# Patient Record
Sex: FEMALE | Race: WHITE | NOT HISPANIC OR LATINO
[De-identification: names, ages, dates, MRNs, and addresses within clinical notes are randomized per-mention and may not be internally consistent; named-entity substitution may affect disease eponyms.]

---

## 2023-10-18 ENCOUNTER — APPOINTMENT (OUTPATIENT)
Dept: PODIATRY | Facility: CLINIC | Age: 63
End: 2023-10-18
Payer: COMMERCIAL

## 2023-10-18 DIAGNOSIS — Z82.49 FAMILY HISTORY OF ISCHEMIC HEART DISEASE AND OTHER DISEASES OF THE CIRCULATORY SYSTEM: ICD-10-CM

## 2023-10-18 DIAGNOSIS — Z83.511 FAMILY HISTORY OF GLAUCOMA: ICD-10-CM

## 2023-10-18 DIAGNOSIS — Z78.9 OTHER SPECIFIED HEALTH STATUS: ICD-10-CM

## 2023-10-18 DIAGNOSIS — M76.829 POSTERIOR TIBIAL TENDINITIS, UNSPECIFIED LEG: ICD-10-CM

## 2023-10-18 PROCEDURE — 73600 X-RAY EXAM OF ANKLE: CPT | Mod: LT

## 2023-10-18 PROCEDURE — 99203 OFFICE O/P NEW LOW 30 MIN: CPT

## 2023-10-18 PROCEDURE — 73630 X-RAY EXAM OF FOOT: CPT | Mod: LT

## 2023-10-30 ENCOUNTER — OUTPATIENT (OUTPATIENT)
Dept: OUTPATIENT SERVICES | Facility: HOSPITAL | Age: 63
LOS: 1 days | End: 2023-10-30
Payer: COMMERCIAL

## 2023-10-30 VITALS
HEART RATE: 67 BPM | HEIGHT: 65.5 IN | SYSTOLIC BLOOD PRESSURE: 126 MMHG | DIASTOLIC BLOOD PRESSURE: 81 MMHG | RESPIRATION RATE: 16 BRPM | OXYGEN SATURATION: 98 % | TEMPERATURE: 98 F | WEIGHT: 166.01 LBS

## 2023-10-30 DIAGNOSIS — N39.0 URINARY TRACT INFECTION, SITE NOT SPECIFIED: ICD-10-CM

## 2023-10-30 DIAGNOSIS — M76.822 POSTERIOR TIBIAL TENDINITIS, LEFT LEG: ICD-10-CM

## 2023-10-30 DIAGNOSIS — Z98.890 OTHER SPECIFIED POSTPROCEDURAL STATES: Chronic | ICD-10-CM

## 2023-10-30 DIAGNOSIS — M19.079 PRIMARY OSTEOARTHRITIS, UNSPECIFIED ANKLE AND FOOT: ICD-10-CM

## 2023-10-30 LAB
ANION GAP SERPL CALC-SCNC: 6 MMOL/L — LOW (ref 7–14)
ANION GAP SERPL CALC-SCNC: 6 MMOL/L — LOW (ref 7–14)
BUN SERPL-MCNC: 15 MG/DL — SIGNIFICANT CHANGE UP (ref 7–23)
BUN SERPL-MCNC: 15 MG/DL — SIGNIFICANT CHANGE UP (ref 7–23)
CALCIUM SERPL-MCNC: 9.7 MG/DL — SIGNIFICANT CHANGE UP (ref 8.4–10.5)
CALCIUM SERPL-MCNC: 9.7 MG/DL — SIGNIFICANT CHANGE UP (ref 8.4–10.5)
CHLORIDE SERPL-SCNC: 105 MMOL/L — SIGNIFICANT CHANGE UP (ref 98–107)
CHLORIDE SERPL-SCNC: 105 MMOL/L — SIGNIFICANT CHANGE UP (ref 98–107)
CO2 SERPL-SCNC: 30 MMOL/L — SIGNIFICANT CHANGE UP (ref 22–31)
CO2 SERPL-SCNC: 30 MMOL/L — SIGNIFICANT CHANGE UP (ref 22–31)
CREAT SERPL-MCNC: 0.74 MG/DL — SIGNIFICANT CHANGE UP (ref 0.5–1.3)
CREAT SERPL-MCNC: 0.74 MG/DL — SIGNIFICANT CHANGE UP (ref 0.5–1.3)
EGFR: 90 ML/MIN/1.73M2 — SIGNIFICANT CHANGE UP
EGFR: 90 ML/MIN/1.73M2 — SIGNIFICANT CHANGE UP
GLUCOSE SERPL-MCNC: 79 MG/DL — SIGNIFICANT CHANGE UP (ref 70–99)
GLUCOSE SERPL-MCNC: 79 MG/DL — SIGNIFICANT CHANGE UP (ref 70–99)
HCT VFR BLD CALC: 41.6 % — SIGNIFICANT CHANGE UP (ref 34.5–45)
HCT VFR BLD CALC: 41.6 % — SIGNIFICANT CHANGE UP (ref 34.5–45)
HGB BLD-MCNC: 13.4 G/DL — SIGNIFICANT CHANGE UP (ref 11.5–15.5)
HGB BLD-MCNC: 13.4 G/DL — SIGNIFICANT CHANGE UP (ref 11.5–15.5)
MCHC RBC-ENTMCNC: 31.3 PG — SIGNIFICANT CHANGE UP (ref 27–34)
MCHC RBC-ENTMCNC: 31.3 PG — SIGNIFICANT CHANGE UP (ref 27–34)
MCHC RBC-ENTMCNC: 32.2 GM/DL — SIGNIFICANT CHANGE UP (ref 32–36)
MCHC RBC-ENTMCNC: 32.2 GM/DL — SIGNIFICANT CHANGE UP (ref 32–36)
MCV RBC AUTO: 97.2 FL — SIGNIFICANT CHANGE UP (ref 80–100)
MCV RBC AUTO: 97.2 FL — SIGNIFICANT CHANGE UP (ref 80–100)
NRBC # BLD: 0 /100 WBCS — SIGNIFICANT CHANGE UP (ref 0–0)
NRBC # BLD: 0 /100 WBCS — SIGNIFICANT CHANGE UP (ref 0–0)
NRBC # FLD: 0 K/UL — SIGNIFICANT CHANGE UP (ref 0–0)
NRBC # FLD: 0 K/UL — SIGNIFICANT CHANGE UP (ref 0–0)
PLATELET # BLD AUTO: 191 K/UL — SIGNIFICANT CHANGE UP (ref 150–400)
PLATELET # BLD AUTO: 191 K/UL — SIGNIFICANT CHANGE UP (ref 150–400)
POTASSIUM SERPL-MCNC: 5 MMOL/L — SIGNIFICANT CHANGE UP (ref 3.5–5.3)
POTASSIUM SERPL-MCNC: 5 MMOL/L — SIGNIFICANT CHANGE UP (ref 3.5–5.3)
POTASSIUM SERPL-SCNC: 5 MMOL/L — SIGNIFICANT CHANGE UP (ref 3.5–5.3)
POTASSIUM SERPL-SCNC: 5 MMOL/L — SIGNIFICANT CHANGE UP (ref 3.5–5.3)
RBC # BLD: 4.28 M/UL — SIGNIFICANT CHANGE UP (ref 3.8–5.2)
RBC # BLD: 4.28 M/UL — SIGNIFICANT CHANGE UP (ref 3.8–5.2)
RBC # FLD: 12.5 % — SIGNIFICANT CHANGE UP (ref 10.3–14.5)
RBC # FLD: 12.5 % — SIGNIFICANT CHANGE UP (ref 10.3–14.5)
SODIUM SERPL-SCNC: 141 MMOL/L — SIGNIFICANT CHANGE UP (ref 135–145)
SODIUM SERPL-SCNC: 141 MMOL/L — SIGNIFICANT CHANGE UP (ref 135–145)
WBC # BLD: 5.17 K/UL — SIGNIFICANT CHANGE UP (ref 3.8–10.5)
WBC # BLD: 5.17 K/UL — SIGNIFICANT CHANGE UP (ref 3.8–10.5)
WBC # FLD AUTO: 5.17 K/UL — SIGNIFICANT CHANGE UP (ref 3.8–10.5)
WBC # FLD AUTO: 5.17 K/UL — SIGNIFICANT CHANGE UP (ref 3.8–10.5)

## 2023-10-30 PROCEDURE — 93010 ELECTROCARDIOGRAM REPORT: CPT

## 2023-10-30 NOTE — H&P PST ADULT - PROBLEM SELECTOR PLAN 1
Scheduled subtalar joint fusion tailor navicular fusion   Written & verbal preop instructions, gi prophylaxis & surgical soap given  Pt verbalized good understanding.  Teach back done on surgical soap instructions. Scheduled subtalar joint fusion tailor navicular fusion.  Written & verbal preop instructions, gi prophylaxis & surgical soap given  Pt verbalized good understanding.  Teach back done on surgical soap instructions.

## 2023-10-30 NOTE — H&P PST ADULT - NSICDXPASTMEDICALHX_GEN_ALL_CORE_FT
PAST MEDICAL HISTORY:  Primary osteoarthritis, unspecified ankle and foot     Urinary tract infection

## 2023-10-30 NOTE — H&P PST ADULT - NSANTHOSAYNRD_GEN_A_CORE
No. ZEFERINO screening performed.  STOP BANG Legend: 0-2 = LOW Risk; 3-4 = INTERMEDIATE Risk; 5-8 = HIGH Risk

## 2023-10-30 NOTE — H&P PST ADULT - NSICDXFAMILYHX_GEN_ALL_CORE_FT
FAMILY HISTORY:  Father  Still living? No  Family history of heart attack, Age at diagnosis: Age Unknown    Mother  Still living? No  FH: brain cancer, Age at diagnosis: Age Unknown

## 2023-10-30 NOTE — H&P PST ADULT - HISTORY OF PRESENT ILLNESS
62y/o female presents for preop eval for   Pt st 64y/o female presents for preop eval for scheduled subtalar joint fusion tailor navicular fusion.   Pt with c/o left foot pain for past several years.  States worse with prolong standing or walking.   Recent imaging show "no arch and collapse tendon".  Preop dx Primary osteoarthritis unspecified ankle and foot.

## 2023-11-01 ENCOUNTER — APPOINTMENT (OUTPATIENT)
Dept: PODIATRY | Facility: CLINIC | Age: 63
End: 2023-11-01
Payer: COMMERCIAL

## 2023-11-01 DIAGNOSIS — M21.40 FLAT FOOT [PES PLANUS] (ACQUIRED), UNSPECIFIED FOOT: ICD-10-CM

## 2023-11-01 DIAGNOSIS — M19.079 PRIMARY OSTEOARTHRITIS, UNSPECIFIED ANKLE AND FOOT: ICD-10-CM

## 2023-11-01 DIAGNOSIS — Z78.9 OTHER SPECIFIED HEALTH STATUS: ICD-10-CM

## 2023-11-01 DIAGNOSIS — M25.572 PAIN IN LEFT ANKLE AND JOINTS OF LEFT FOOT: ICD-10-CM

## 2023-11-01 PROCEDURE — 99213 OFFICE O/P EST LOW 20 MIN: CPT

## 2023-11-02 ENCOUNTER — TRANSCRIPTION ENCOUNTER (OUTPATIENT)
Age: 63
End: 2023-11-02

## 2023-11-02 PROBLEM — M21.40 PES PLANUS, UNSPECIFIED LATERALITY: Status: ACTIVE | Noted: 2023-10-18

## 2023-11-03 PROBLEM — N39.0 URINARY TRACT INFECTION, SITE NOT SPECIFIED: Chronic | Status: ACTIVE | Noted: 2023-10-30

## 2023-11-03 PROBLEM — M19.079 PRIMARY OSTEOARTHRITIS, UNSPECIFIED ANKLE AND FOOT: Chronic | Status: ACTIVE | Noted: 2023-10-30

## 2023-11-05 ENCOUNTER — TRANSCRIPTION ENCOUNTER (OUTPATIENT)
Age: 63
End: 2023-11-05

## 2023-11-06 ENCOUNTER — APPOINTMENT (OUTPATIENT)
Dept: PODIATRY | Facility: HOSPITAL | Age: 63
End: 2023-11-06

## 2023-11-06 ENCOUNTER — RESULT REVIEW (OUTPATIENT)
Age: 63
End: 2023-11-06

## 2023-11-06 ENCOUNTER — TRANSCRIPTION ENCOUNTER (OUTPATIENT)
Age: 63
End: 2023-11-06

## 2023-11-06 ENCOUNTER — OUTPATIENT (OUTPATIENT)
Dept: OUTPATIENT SERVICES | Facility: HOSPITAL | Age: 63
LOS: 1 days | Discharge: HOME | End: 2023-11-06
Payer: COMMERCIAL

## 2023-11-06 VITALS
DIASTOLIC BLOOD PRESSURE: 62 MMHG | HEART RATE: 78 BPM | OXYGEN SATURATION: 97 % | TEMPERATURE: 98 F | SYSTOLIC BLOOD PRESSURE: 112 MMHG | RESPIRATION RATE: 18 BRPM

## 2023-11-06 VITALS
SYSTOLIC BLOOD PRESSURE: 119 MMHG | HEART RATE: 72 BPM | HEIGHT: 65.5 IN | WEIGHT: 166.01 LBS | TEMPERATURE: 98 F | RESPIRATION RATE: 16 BRPM | OXYGEN SATURATION: 96 % | DIASTOLIC BLOOD PRESSURE: 66 MMHG

## 2023-11-06 DIAGNOSIS — Z98.890 OTHER SPECIFIED POSTPROCEDURAL STATES: Chronic | ICD-10-CM

## 2023-11-06 DIAGNOSIS — M76.822 POSTERIOR TIBIAL TENDINITIS, LEFT LEG: ICD-10-CM

## 2023-11-06 PROCEDURE — 28725 ARTHRODESIS SUBTALAR: CPT

## 2023-11-06 PROCEDURE — 28740 FUSION OF FOOT BONES: CPT

## 2023-11-06 PROCEDURE — 73630 X-RAY EXAM OF FOOT: CPT | Mod: 26,LT

## 2023-11-06 DEVICE — IMPLANTABLE DEVICE: Type: IMPLANTABLE DEVICE | Status: FUNCTIONAL

## 2023-11-06 DEVICE — GWIRE 2X150MM: Type: IMPLANTABLE DEVICE | Status: FUNCTIONAL

## 2023-11-06 DEVICE — PIN STEINMAN SMTH 2.5X100MM: Type: IMPLANTABLE DEVICE | Status: FUNCTIONAL

## 2023-11-06 DEVICE — GWIRE CALIB 3.2X300MM SS: Type: IMPLANTABLE DEVICE | Status: FUNCTIONAL

## 2023-11-06 RX ORDER — NITROFURANTOIN MACROCRYSTAL 50 MG
1 CAPSULE ORAL
Refills: 0 | DISCHARGE

## 2023-11-06 NOTE — ASU DISCHARGE PLAN (ADULT/PEDIATRIC) - CARE PROVIDER_API CALL
Lombardi, Charles M  [facility]  [address] [place] [address]  Phone: [phone]  Fax: [phone]  Established Patient  Follow Up Time: 1 week

## 2023-11-06 NOTE — ASU DISCHARGE PLAN (ADULT/PEDIATRIC) - FOLLOW UP APPOINTMENTS
----- Message from Angelia Sampson sent at 8/7/2020  2:21 PM CDT -----  Contact: Pt radha [email or web address]@[phone]--  Needs Advice    Reason for call:--WIC form--        Communication Preference:--Yannick--Mom--[phone]--    Additional Information:Mom calling to se if the [employer] form can be fax to over today? Please fax to [phone]. Please call to advise.            
Returned call, spoke to grandmother and informed her that we are working on WIC form.   
[phone]

## 2023-11-06 NOTE — ASU DISCHARGE PLAN (ADULT/PEDIATRIC) - ASU DC SPECIAL INSTRUCTIONSFT
You just had L foot surgery   - Keep your dressings clean dry and intact  - Do not remove the cast   - Do not weight bear to the L foot   - Take pain medications as instructed   - Follow up with Dr. Lombardi within 1 week You just had L foot surgery   - Keep your dressings clean dry and intact  - Do not remove the cast   - Do not weight bear to the L foot   - Take pain medications as instructed   - Follow up with Dr. Lombardi/ Petra within 1 week

## 2023-11-06 NOTE — ASU DISCHARGE PLAN (ADULT/PEDIATRIC) - NS MD DC FALL RISK RISK
For information on Fall & Injury Prevention, visit: [email or web address] OR  [email or web address] OR  https://www.cdc.gov/steadi/patient.html

## 2023-11-06 NOTE — ASU DISCHARGE PLAN (ADULT/PEDIATRIC) - NURSING INSTRUCTIONS
Progress to regular diet as tolerated.  Keep well hydrated. Next dose of Tylenol may be taken at 6pm

## 2023-11-10 ENCOUNTER — APPOINTMENT (OUTPATIENT)
Dept: PODIATRY | Facility: CLINIC | Age: 63
End: 2023-11-10
Payer: COMMERCIAL

## 2023-11-10 PROCEDURE — 99024 POSTOP FOLLOW-UP VISIT: CPT

## 2023-11-20 ENCOUNTER — APPOINTMENT (OUTPATIENT)
Dept: PODIATRY | Facility: CLINIC | Age: 63
End: 2023-11-20
Payer: COMMERCIAL

## 2023-11-20 DIAGNOSIS — M21.40 FLAT FOOT [PES PLANUS] (ACQUIRED), UNSPECIFIED FOOT: ICD-10-CM

## 2023-11-20 PROCEDURE — 73630 X-RAY EXAM OF FOOT: CPT | Mod: LT

## 2023-11-20 RX ORDER — OXYCODONE AND ACETAMINOPHEN 5; 325 MG/1; MG/1
5-325 TABLET ORAL
Qty: 42 | Refills: 0 | Status: COMPLETED | COMMUNITY
Start: 2023-11-06 | End: 2023-11-20

## 2023-12-04 ENCOUNTER — APPOINTMENT (OUTPATIENT)
Dept: PODIATRY | Facility: CLINIC | Age: 63
End: 2023-12-04
Payer: COMMERCIAL

## 2023-12-04 PROCEDURE — 73630 X-RAY EXAM OF FOOT: CPT | Mod: LT

## 2023-12-18 ENCOUNTER — APPOINTMENT (OUTPATIENT)
Dept: PODIATRY | Facility: CLINIC | Age: 63
End: 2023-12-18
Payer: COMMERCIAL

## 2023-12-18 PROCEDURE — 73630 X-RAY EXAM OF FOOT: CPT | Mod: LT

## 2023-12-21 NOTE — HISTORY OF PRESENT ILLNESS
[FreeTextEntry1] : Patient presents today for postoperative follow up of left foot subtalar and talonavicular joint arthrodesis performed on 11/06/23.  Patient states that on 12/16/23, she lost her balance as she was getting up from a chair and going to her scooter, and she hit her heel on the ground.  She did not lose consciousness, deny any head strike.  She felt immediate pain to the plantar heel.  The pain has been slowly improving since the episode of trauma.  She has been elevating but she has not iced it.  Patient has been non-weight bearing with crutches and the knee scooter.

## 2023-12-21 NOTE — ASSESSMENT
[FreeTextEntry1] : Impression: Postoperative visit after left subtalar and talonavicular joint fusion (Z48.89).  Treatment: Advised patient to continue to be non-weight bearing in the Cam boot.  Continue elevation, compression with CompressiGrip.  Patient can use ice over the heel.  Will continue non-weight bearing for 2 more weeks with repeat imaging and then likely will transition to weight bearing with the Cam boot.   Return: 2 weeks for repeat imaging.

## 2023-12-21 NOTE — PROCEDURE
[FreeTextEntry1] : X-rays were taken of the left foot to evaluate the heel. (4 views, non-weight bearing) Patient did not put enough pressure on the left heel for a proper calcaneal axial view.  The hardware is intact across the subtalar joint with one screw and over the talonavicular joint with 2 screws and staple.  No migration of hardware visible.  No breakage.  Increasing osseous consolidation visible across the subtalar joint.  No change in osseous consolidation visible across the talonavicular joint compared to prior imaging.  No calcaneal fracture or lucency identified.   The foot is rectus.  No acute fractures/dislocations or erosions present.

## 2023-12-21 NOTE — PHYSICAL EXAM
[2+] : left foot dorsalis pedis 2+ [Antalgic Gait Left] : antalgic on the left [Reverse Phalen's Test Both Wrists] : negative left [Dean's Test For Left Radial Artery Patency] : positive left [Sensation] : the sensory exam was normal to light touch and pinprick [No Focal Deficits] : no focal deficits [Deep Tendon Reflexes (DTR)] : deep tendon reflexes were 2+ and symmetric [Motor Exam] : the motor exam was normal [Ankle Swelling (On Exam)] : not present [FreeTextEntry3] : CFT: 3 seconds x 10.  Temperature gradient: normal.  [de-identified] : Increased pain on palpation at the plantar heel in the area of the head of the screw.  No prominent hardware palpable.  No gross deformities.  No erythema present.  No pain on palpation over the talar-navicular joint fusion site.  No pain on palpation over the subtalar and midtarsal joint ROM on the left.  Ankle joint ROM intact, non-painful, non-crepitant with passive/active ROM at the left ankle present.   [FreeTextEntry1] : Left foot incision site well coapted without dehiscence, no hematoma, fluctuance, erythema, increase in temperature, soft tissue crepitus, drainage, purulence or malodor appreciated.  Edema is resolving.  Resolved pain on palpation over the incision sites present.  Ecchymosis at the bases of 2 through 4 resolving.  No pain with calf squeeze or with ankle joint dorsiflexion on the left.

## 2024-01-02 ENCOUNTER — APPOINTMENT (OUTPATIENT)
Dept: PODIATRY | Facility: CLINIC | Age: 64
End: 2024-01-02
Payer: COMMERCIAL

## 2024-01-02 PROCEDURE — 73630 X-RAY EXAM OF FOOT: CPT | Mod: LT

## 2024-01-16 ENCOUNTER — APPOINTMENT (OUTPATIENT)
Dept: PODIATRY | Facility: CLINIC | Age: 64
End: 2024-01-16

## 2024-01-23 ENCOUNTER — APPOINTMENT (OUTPATIENT)
Dept: PODIATRY | Facility: CLINIC | Age: 64
End: 2024-01-23
Payer: COMMERCIAL

## 2024-01-23 PROCEDURE — 73630 X-RAY EXAM OF FOOT: CPT | Mod: LT

## 2024-01-23 NOTE — ASSESSMENT
[FreeTextEntry1] : Impression: Postoperative visit after left subtalar and talonavicular joint fusion (Z48.89).  Treatment: Advised patient to start transitioning to weight bearing with the Cam boot and crutches.  She can transition out of the crutches entirely when she feels comfortable.  Continue ROM exercises, when not weight bearing.   Return: 2 weeks for repeat imaging.

## 2024-01-23 NOTE — HISTORY OF PRESENT ILLNESS
[Post-op Sandals] : post-op sandals [Crutches] : crutches [CAM Boot] : a CAM boot [FreeTextEntry1] : Patient presents today for postoperative follow up of left foot subtalar and talonavicular joint arthrodesis performed on 11/06/23.  Patient states that her left heel pain has improved.  She still feels occasional pain to the area.  Denies any further trauma, falls.  Denies any redness or drainage.  Denies any new pain.  She has been doing off-weight bearing ROM exercises.  She has been non-weight bearing in the Cam boot on the left side with crutches and knee scooter.

## 2024-01-23 NOTE — PROCEDURE
[FreeTextEntry1] : X-rays were taken of the left foot to evaluate for osseous consolidation of hardware.   (4 views - weight bearing) Increased osseous consolidation visible across the talonavicular and subtalar joint fusion sites with hardware intact.  No subsidence, breakage or migration.  No lucencies.  No fractures/dislocations or erosions.  The foot is rectus.  No flatfoot deformity present.

## 2024-01-23 NOTE — PHYSICAL EXAM
[2+] : left foot dorsalis pedis 2+ [Antalgic Gait Left] : antalgic on the left [Dean's Test For Left Radial Artery Patency] : positive left [Sensation] : the sensory exam was normal to light touch and pinprick [No Focal Deficits] : no focal deficits [Deep Tendon Reflexes (DTR)] : deep tendon reflexes were 2+ and symmetric [Motor Exam] : the motor exam was normal [Reverse Phalen's Test Both Wrists] : negative left [Ankle Swelling (On Exam)] : not present [FreeTextEntry3] : CFT: 3 seconds x 10.  Temperature gradient: normal.  [de-identified] : No pain on palpation over the left plantar heel insertion of the calcaneal screw.  No prominent hardware palpable.  No gross deformities.  No erythema, minimal edema.  No pain on palpation over the talar-navicular joint fusion site or the subtalar joint fusion site.  Absent subtalar and talar navicular joint motion.  Normal ankle joint ROM, non-painful, non-crepitant.  The foot is rectus to the leg.     [FreeTextEntry1] : Left foot incision site well coapted without dehiscence, no hematoma, fluctuance, erythema, increase in temperature, soft tissue crepitus, drainage, purulence or malodor appreciated.  Edema is resolving.  Resolved pain on palpation over the incision sites present.  Ecchymosis at the bases of 2 through 4 resolving.  No pain with calf squeeze or with ankle joint dorsiflexion on the left.

## 2024-02-20 ENCOUNTER — APPOINTMENT (OUTPATIENT)
Dept: PODIATRY | Facility: CLINIC | Age: 64
End: 2024-02-20
Payer: COMMERCIAL

## 2024-02-20 DIAGNOSIS — Z48.89 ENCOUNTER FOR OTHER SPECIFIED SURGICAL AFTERCARE: ICD-10-CM

## 2024-02-20 PROCEDURE — 73630 X-RAY EXAM OF FOOT: CPT | Mod: LT

## 2024-02-21 PROBLEM — Z48.89 POSTOPERATIVE VISIT: Status: ACTIVE | Noted: 2023-11-14

## 2024-02-23 NOTE — ASSESSMENT
[FreeTextEntry1] : Impression: Postoperative visit after left subtalar and talonavicular joint fusion.  Treatment: I advised patient to transition from crutches to a cane. She was provided with a script that she could obtain a cane from a surgical supply store. I advised patient to continue wearing supportive sneakers. She was also referred to physical therapy for strengthening exercises. Will see patient back in one month. Patient was seen with Dr. Lombardi.

## 2024-02-23 NOTE — PROCEDURE
[FreeTextEntry1] : X-ray of the left foot, 4 views weight-bearing, were taken to evaluate for fracture vs. osseous consolidation. Consolidated subtalar joint and talonavicular joint fusions with hardware intact. No subsidence or hardware breakage or migration. No lucencies at the fusion sites at the joints. No fractures or dislocations. Foot is rectus. No residual flatfoot deformity present.

## 2024-02-23 NOTE — HISTORY OF PRESENT ILLNESS
[FreeTextEntry1] : Patient returns today for postoperative follow-up after left foot subtalar and talonavicular joint arthrodesis performed on 11/6/23. Patient has transitioned into sneakers. She ambulates with crutches for balance. She states she puts the majority of the pressure on her left foot when she walks.  She reports occasional pain to the medial aspect of her left ankle when she walks. She wears Compressogrip for swelling control.

## 2024-02-23 NOTE — PHYSICAL EXAM
[2+] : left foot dorsalis pedis 2+ [Sensation] : the sensory exam was normal to light touch and pinprick [No Focal Deficits] : no focal deficits [Deep Tendon Reflexes (DTR)] : deep tendon reflexes were 2+ and symmetric [Motor Exam] : the motor exam was normal [Ankle Swelling (On Exam)] : not present [FreeTextEntry3] : CFT: 3 seconds x 10. Temperature gradient: normal. [de-identified] : Left medial ankle/foot mild discomfort upon palpation over the tarsal tunnel with no associated swelling, neg Tinnel signs. Muscle power of dorsilfexion, plantar flexion, eversion and inversion of the left lower extremity is 4/5. No edema present over the tarsal tunnel. No pain on palpation over the hardware of the left foot with no hardware prominence under the skin. Absent subtalar and midtarsal joint ROM on the left. Normal ankle joint ROM, non-painful, non-crepitant with no restrictions. No pain on palpation over the medial and lateral malleoli.  Foot is rectus to the leg upon weight-bearing.  [FreeTextEntry1] : All incisions sites are well coapted without dehiscence. No clinical signs of infection. Mild edema is present in the left foot. No pain on palpation over the incision sites.

## 2024-03-21 ENCOUNTER — APPOINTMENT (OUTPATIENT)
Dept: PODIATRY | Facility: CLINIC | Age: 64
End: 2024-03-21
Payer: COMMERCIAL

## 2024-03-21 DIAGNOSIS — M19.072 PRIMARY OSTEOARTHRITIS, LEFT ANKLE AND FOOT: ICD-10-CM

## 2024-03-21 PROCEDURE — 73630 X-RAY EXAM OF FOOT: CPT | Mod: LT

## 2024-03-25 PROBLEM — M19.072 PRIMARY OSTEOARTHRITIS OF LEFT FOOT: Status: ACTIVE | Noted: 2024-03-22

## 2024-03-25 NOTE — HISTORY OF PRESENT ILLNESS
[FreeTextEntry1] : Patient presents today with her  for evaluation. She is just over 4 months S/P double arthrodesis of the rearfoot. Adilia is away, so I am seeing the patient. She is doing well. She never had a chance to do therapy because she had gotten sick, but she has been using stability sneakers and walking without problem. She is really progressing nicely without complaint.

## 2024-03-25 NOTE — PROCEDURE
[FreeTextEntry1] : X-rays were taken to evaluate healing. X-ray Report: (Left foot - 3 views) Multiple views demonstrate excellent repositioning of the foot with good signs of osteogenesis, healing and fusion at both fusion sites with fixation in good position without signs of breakage.

## 2024-03-25 NOTE — PHYSICAL EXAM
[2+] : right foot dorsalis pedis 2+ [Sensation] : the sensory exam was normal to light touch and pinprick [No Focal Deficits] : no focal deficits [Deep Tendon Reflexes (DTR)] : deep tendon reflexes were 2+ and symmetric [Ankle Swelling (On Exam)] : not present [FreeTextEntry3] : CFT: 3 seconds x 10. Temperature gradient: normal. [de-identified] : There is minimal swelling. There is good positioning of the foot and really good clinical signs of healing at the TN joint and subtalar joint with no pain with palpation over the incisions or surgical site. [FreeTextEntry1] : Incisions are well coapted.

## 2024-03-25 NOTE — ASSESSMENT
[FreeTextEntry1] : Impression: Osteoarthritis.  Treatment: At this point she has been walking comfortably in shoes. I recommend she continues to walk only and take it easy for another 2 months in her stability sneakers. I explained to the patient that it seems like she is doing really nicely and walking well without pain, so no need for therapy. She will just continue to only walk with her assistive device for the next 2 months and then will gradually increase activity thereafter. She is discharged from treatment. She can follow-up in the office with any issues whatsoever.

## 2024-07-23 NOTE — ASU PREOPERATIVE ASSESSMENT, ADULT ([facility] ONLY) - TEMPERATURE IN FAHRENHEIT (DEGREES F)
97.5 Detail Level: Detailed Depth Of Biopsy: dermis Was A Bandage Applied: Yes Size Of Lesion In Cm: 0.5 X Size Of Lesion In Cm: 0 Biopsy Type: H and E Biopsy Method: Dermablade Anesthesia Type: 1% lidocaine with epinephrine Hemostasis: Drysol Wound Care: Petrolatum Dressing: bandage Destruction After The Procedure: No Type Of Destruction Used: Curettage Curettage Text: The wound bed was treated with curettage after the biopsy was performed. Cryotherapy Text: The wound bed was treated with cryotherapy after the biopsy was performed. Electrodesiccation Text: The wound bed was treated with electrodesiccation after the biopsy was performed. Electrodesiccation And Curettage Text: The wound bed was treated with electrodesiccation and curettage after the biopsy was performed. Silver Nitrate Text: The wound bed was treated with silver nitrate after the biopsy was performed. Lab: 6 Lab Facility: 3 Consent was obtained and risks were reviewed including but not limited to scarring, infection, bleeding, scabbing, incomplete removal, nerve damage and allergy to anesthesia. Post-Care Instructions: I reviewed with the patient in detail post-care instructions. Patient is to keep the biopsy site dry overnight, and then apply bacitracin twice daily until healed. Patient may apply hydrogen peroxide soaks to remove any crusting. Notification Instructions: Patient will be notified of biopsy results. However, patient instructed to call the office if not contacted within 2 weeks. Billing Type: Third-Party Bill Information: Selecting Yes will display possible errors in your note based on the variables you have selected. This validation is only offered as a suggestion for you. PLEASE NOTE THAT THE VALIDATION TEXT WILL BE REMOVED WHEN YOU FINALIZE YOUR NOTE. IF YOU WANT TO FAX A PRELIMINARY NOTE YOU WILL NEED TO TOGGLE THIS TO 'NO' IF YOU DO NOT WANT IT IN YOUR FAXED NOTE.

## 2024-11-13 NOTE — H&P PST ADULT - LAST STRESS TEST
denies

## (undated) DEVICE — FRAZIER SUCTION TIP 8FR

## (undated) DEVICE — TOURNIQUET CUFF 18" DUAL PORT SINGLE BLADDER LUER LOCK (BLACK)

## (undated) DEVICE — POSITIONER PATIENT SAFETY STRAP 3X60"

## (undated) DEVICE — BUR WEDGE MICA 3.1X13MM

## (undated) DEVICE — GAUGE DEPTH 4MM BLUE

## (undated) DEVICE — DRSG STOCKINETTE TUBULAR 6"

## (undated) DEVICE — Device

## (undated) DEVICE — VENODYNE/SCD SLEEVE CALF BARIATRIC

## (undated) DEVICE — VENODYNE/SCD SLEEVE CALF MEDIUM

## (undated) DEVICE — VENODYNE/SCD SLEEVE CALF LARGE

## (undated) DEVICE — TOURNIQUET ESMARK 4"

## (undated) DEVICE — NDL HYPO SAFE 18G X 1.5" (PINK)

## (undated) DEVICE — ELCTR GROUNDING PAD ADULT COVIDIEN

## (undated) DEVICE — DRILL BIT STRYKER 2X135MM

## (undated) DEVICE — SUT MONOCRYL 4-0 27" PS-2 UNDYED

## (undated) DEVICE — DRSG MASTISOL

## (undated) DEVICE — LABELS BLANK W PEN

## (undated) DEVICE — SYR LUER LOK 10CC

## (undated) DEVICE — DRSG CURITY GAUZE SPONGE 4 X 4" 12-PLY

## (undated) DEVICE — ELCTR ROCKER SWITCH PENCIL BLUE 10FT

## (undated) DEVICE — SUT ETHILON 5-0 18" PS-2

## (undated) DEVICE — DRSG KLING 2"

## (undated) DEVICE — SUT VICRYL 3-0 18" PS-2 UNDYED

## (undated) DEVICE — BUR STRYKER OVAL 4 X 38MM

## (undated) DEVICE — DRILL BIT STRYKER ORTHO CANN 3.5MM

## (undated) DEVICE — BEAVER BLADE MINI (ORANGE)

## (undated) DEVICE — DRSG STERISTRIPS 0.25 X 3"

## (undated) DEVICE — DRSG WEBRIL 4"

## (undated) DEVICE — GLV 6.5 PROTEXIS (WHITE)

## (undated) DEVICE — DRSG ACE BANDAGE 4" NS

## (undated) DEVICE — SAW BLADE MICROAIRE SAGITTAL 9.4MMX25.4MMX0.6MM

## (undated) DEVICE — TUBING IRRIGATION 400MM

## (undated) DEVICE — SOL IRR POUR NS 0.9% 500ML

## (undated) DEVICE — POSITIONER FOAM EGG CRATE ULNAR 2PCS (PINK)

## (undated) DEVICE — PACK LIJ BASIC ORTHO

## (undated) DEVICE — PREP CHLORAPREP HI-LITE ORANGE 26ML

## (undated) DEVICE — BLADE SURGICAL #15 CARBON

## (undated) DEVICE — DRILL BIT STRYKER ORTHO TRAUMA 4.9MM

## (undated) DEVICE — NDL HYPO REGULAR BEVEL 25G X 1.5" (BLUE)

## (undated) DEVICE — WARMING BLANKET UPPER ADULT

## (undated) DEVICE — SUT VICRYL 3-0 18" TIES UNDYED